# Patient Record
Sex: FEMALE | Race: ASIAN | NOT HISPANIC OR LATINO | URBAN - METROPOLITAN AREA
[De-identification: names, ages, dates, MRNs, and addresses within clinical notes are randomized per-mention and may not be internally consistent; named-entity substitution may affect disease eponyms.]

---

## 2018-10-20 ENCOUNTER — EMERGENCY (EMERGENCY)
Facility: HOSPITAL | Age: 43
LOS: 1 days | Discharge: HOME | End: 2018-10-20
Attending: EMERGENCY MEDICINE
Payer: COMMERCIAL

## 2018-10-20 VITALS
TEMPERATURE: 98 F | RESPIRATION RATE: 18 BRPM | OXYGEN SATURATION: 96 % | SYSTOLIC BLOOD PRESSURE: 116 MMHG | WEIGHT: 179.9 LBS | HEIGHT: 68 IN | HEART RATE: 87 BPM | DIASTOLIC BLOOD PRESSURE: 79 MMHG

## 2018-10-20 VITALS
DIASTOLIC BLOOD PRESSURE: 94 MMHG | HEART RATE: 82 BPM | TEMPERATURE: 98 F | SYSTOLIC BLOOD PRESSURE: 125 MMHG | OXYGEN SATURATION: 96 % | RESPIRATION RATE: 16 BRPM

## 2018-10-20 PROCEDURE — 99284 EMERGENCY DEPT VISIT MOD MDM: CPT | Mod: 25

## 2018-10-20 PROCEDURE — 96374 THER/PROPH/DIAG INJ IV PUSH: CPT

## 2018-10-20 PROCEDURE — 99284 EMERGENCY DEPT VISIT MOD MDM: CPT

## 2018-10-20 PROCEDURE — 96375 TX/PRO/DX INJ NEW DRUG ADDON: CPT

## 2018-10-20 RX ORDER — KETOROLAC TROMETHAMINE 30 MG/ML
15 SYRINGE (ML) INJECTION ONCE
Qty: 0 | Refills: 0 | Status: DISCONTINUED | OUTPATIENT
Start: 2018-10-20 | End: 2018-10-20

## 2018-10-20 RX ORDER — SODIUM CHLORIDE 9 MG/ML
3 INJECTION INTRAMUSCULAR; INTRAVENOUS; SUBCUTANEOUS EVERY 8 HOURS
Qty: 0 | Refills: 0 | Status: DISCONTINUED | OUTPATIENT
Start: 2018-10-20 | End: 2018-10-24

## 2018-10-20 RX ORDER — MORPHINE SULFATE 50 MG/1
4 CAPSULE, EXTENDED RELEASE ORAL ONCE
Qty: 0 | Refills: 0 | Status: DISCONTINUED | OUTPATIENT
Start: 2018-10-20 | End: 2018-10-20

## 2018-10-20 RX ORDER — ACETAMINOPHEN 500 MG
1000 TABLET ORAL ONCE
Qty: 0 | Refills: 0 | Status: COMPLETED | OUTPATIENT
Start: 2018-10-20 | End: 2018-10-20

## 2018-10-20 RX ORDER — DIAZEPAM 5 MG
5 TABLET ORAL ONCE
Qty: 0 | Refills: 0 | Status: DISCONTINUED | OUTPATIENT
Start: 2018-10-20 | End: 2018-10-20

## 2018-10-20 RX ORDER — LIDOCAINE 4 G/100G
1 CREAM TOPICAL ONCE
Qty: 0 | Refills: 0 | Status: COMPLETED | OUTPATIENT
Start: 2018-10-20 | End: 2018-10-20

## 2018-10-20 RX ADMIN — MORPHINE SULFATE 4 MILLIGRAM(S): 50 CAPSULE, EXTENDED RELEASE ORAL at 15:39

## 2018-10-20 RX ADMIN — Medication 5 MILLIGRAM(S): at 17:51

## 2018-10-20 RX ADMIN — Medication 60 MILLIGRAM(S): at 15:39

## 2018-10-20 RX ADMIN — Medication 15 MILLIGRAM(S): at 19:45

## 2018-10-20 RX ADMIN — Medication 400 MILLIGRAM(S): at 15:39

## 2018-10-20 RX ADMIN — LIDOCAINE 1 PATCH: 4 CREAM TOPICAL at 15:39

## 2018-10-20 NOTE — ED ADULT NURSE NOTE - CHPI ED NUR SYMPTOMS NEG
no constipation/no fatigue/no motor function loss/no bladder dysfunction/no anorexia/no difficulty bearing weight/no neck tenderness/no numbness/no tingling/no bowel dysfunction

## 2018-10-20 NOTE — ED ADULT NURSE NOTE - OBJECTIVE STATEMENT
pt is a 43 yr F presents with R lower back pain shooting down R leg since 930 this morning. pt has hx of chronic back pain that usually resolves with aleve, took aleve approx 2-230pm without relief. unable to ambulate related to pain. denies numbness/tingling/weakness/urinary incontinence. +tender to touch in R lower back. no distress. family at bedside.

## 2018-10-20 NOTE — ED ADULT NURSE NOTE - NSIMPLEMENTINTERV_GEN_ALL_ED
Implemented All Fall Risk Interventions:  [place] to call system. Call bell, personal items and telephone within reach. Instruct patient to call for assistance. Room bathroom lighting operational. Non-slip footwear when patient is off stretcher. Physically safe environment: no spills, clutter or unnecessary equipment. Stretcher in lowest position, wheels locked, appropriate side rails in place. Provide visual cue, wrist band, yellow gown, etc. Monitor gait and stability. Monitor for mental status changes and reorient to person, place, and time. Review medications for side effects contributing to fall risk. Reinforce activity limits and safety measures with patient and family.

## 2018-10-20 NOTE — ED PROVIDER NOTE - ATTENDING CONTRIBUTION TO CARE
**ATTENDING ADDENDUM (Dr. Kashif Reeves): I attest that I have directly examined this patient and reviewed and formulated the diagnostic and therapeutic management plan in collaboration with the EM resident. Please see MDM note and remainder of EMR for findings from CC, HPI, ROS, and PE. (Gold)

## 2018-10-20 NOTE — ED PROVIDER NOTE - CARE PLAN
Principal Discharge DX:	Acute right-sided low back pain with right-sided sciatica  Goal:	ATTENDING ADDENDUM (Dr. Kashif Reeves): Goals of care include resolution of emergent/urgent symptoms and concerns, and restoration to baseline level of homeostasis.  Secondary Diagnosis:	Sciatica of right side

## 2018-10-20 NOTE — ED PROVIDER NOTE - OBJECTIVE STATEMENT
43F hx chronic back pain 2/2 herniated discs presents with back pain. Patient was cooking in the kitchen when she developed sudden onset sharp, constant R lower back pain that radiates down R leg. Worse with movement and bending forward. Better when lying still. Feels typical of her usual back pain, just very severe. Took 1 aleve at noon. Denies nausea/vomiting, fevers/chills, abd pain, cp, sob, weakness, incontinence, injections, no other concerns. 43F hx chronic back pain 2/2 herniated discs presents with back pain. Patient was cooking in the kitchen when she developed sudden onset sharp, constant R lower back pain that radiates down R leg. Worse with movement and bending forward. Better when lying still. Feels typical of her usual back pain, just very severe. Took 1 aleve at noon. Denies nausea/vomiting, fevers/chills, abd pain, cp, sob, weakness, incontinence, injections, no other concerns.  **ATTENDING ADDENDUM (Dr. Kashif Reeves): I attest that I have directly and personally interviewed and examined this patient and elicited a comparable history of present illness and review of systems as documented, along with my EM resident. I attest that I have made significant contributions to the documentation where necessary and as noted in the EMR.

## 2018-10-20 NOTE — ED PROVIDER NOTE - SKIN, MLM
Skin normal color for race, warm, dry and intact. **ATTENDING ADDENDUM (Dr. Kashif Reeves): NO rashes, lesions, vesicles, cellulitis, petechiae, purpurae, track marks or ecchymoses.

## 2018-10-20 NOTE — ED PROVIDER NOTE - CHIEF COMPLAINT
The patient is a 43y Female complaining of right-sided acute on chronic low back (lumbar) pain with radiation down right leg.

## 2018-10-20 NOTE — ED PROVIDER NOTE - PROGRESS NOTE DETAILS
**ATTENDING ADDENDUM (Dr. Kashif Reeves): evaluated by ED attending. Agree with goals/plan of ED care as described in EMR, including diagnostics, therapeutics and consultation as clinically warranted. Anticipatory guidance provided. Will continue to observe and monitor closely. NO evidence of acute cord syndrome, serious bacterial infection or sepsis/severe sepsis, fracture or dislocation, or other worrisome cause for pain other than exacerbation of known diskogenic musculoskeletal back pain at this time. **ATTENDING ADDENDUM (Dr. Kashif Reeves): reviewed ordered with MAGDALENE Carias. Patient had already taken naproxen at home prior to arrival, therefore will hold/cancel order for ketorolac IV. Will continue to observe and monitor closely. **ATTENDING ADDENDUM (Dr. Kashif Reeves): patient with modest improvement following medications. Case reviewed with EM resident Bin. Will continue to observe and monitor closely. Will attempt to add different analgesics (and antispasmodics) to patient's regiment. Anticipatory guidance provided by ED team throughout ED course. AG PGY2: Patient feels improved and would like to go home. appears well. will d/c home with f/u precautions. AG PGY2: Patient feels improved and would like to go home. appears well. will d/c home with f/u precautions.  Attending Statement: Agree with the above.  Ambulatory, comfortable c d/c plan.  To F/U c PCP.  Return precautions provided.  --BMM

## 2018-10-20 NOTE — ED PROVIDER NOTE - PLAN OF CARE
ATTENDING ADDENDUM (Dr. Kashif Reeves): Goals of care include resolution of emergent/urgent symptoms and concerns, and restoration to baseline level of homeostasis.

## 2018-10-20 NOTE — ED PROVIDER NOTE - MEDICAL DECISION MAKING DETAILS
**ATTENDING MEDICAL DECISION MAKING/SYNTHESIS (Dr. Kashif Reeves): I have reviewed the Chief Complaint, the HPI, the ROS, and have directly performed and confirmed the findings on the Physical Examination. I have reviewed the medical decision making with all providers, as applicable. The PROBLEM REPRESENTATION at this time is: 43-year-old woman with history of uterine mass (presumed fibroid, scheduled for resection in December 2018) and chronic low back pain (right-sided sciatica) now presenting with acute exacerbation of right-sided radicular sciatica low back pain. The MOST LIKELY DIAGNOSIS, and the LIST OF DIFFERENTIAL DIAGNOSES, includes (but is not limited to) the following: sciatica/radicular back pain (likely), fracture or dislocation of spine (NO evidence), trauma (none reported, DV considered but unlikely), serious bacterial infection or sepsis/severe sepsis e.g. diskitis, epidural hematoma, urinary tract infection, pyelonephritis, herpes zoster or equivalent (NO evidence), dehydration, electrolyte-metabolic-endocrine derangements, spine emergency (occult metastatic disease, fracture or equivalent causing partial or complete cord syndrome; NO evidence; NO evidence of saddle anesthesia, acute urinary retention or incontinence at this time). The likelihood of each of these diagnoses has been appropriately considered in the context of this patient's presentation and my evaluation. PLAN: as described in EMR, including diagnostics, therapeutics and consultation as clinically warranted. I will continue to reevaluate the patient, including the results of all testing, and monitor response to therapy throughout the patient's course in the ED.

## 2018-10-20 NOTE — ED PROVIDER NOTE - CHPI ED SYMPTOMS NEG
**ATTENDING ADDENDUM (Dr. Kashif Reeves): NO saddle anesthesia, acute urinary retention, or incontience./no numbness/no bowel dysfunction/no fatigue/no tingling/no constipation/no bladder dysfunction/no motor function loss

## 2018-10-20 NOTE — ED PROVIDER NOTE - PHYSICAL EXAMINATION
**ATTENDING ADDENDUM (Dr. Kashif Reeves): I have reviewed and substantially contributed to the elements of the PE as documented above. I have directly performed an examination of this patient in conjunction with the other members (EM resident/PA/NP) of the patient care team. Of note, and in addition to the above, the patient prefers to be in the left lateral recumbent position. Radicular pain reported moving down the right lower extremity from the right paraspinal lumbar back area. NO rashes, lesions, vesicles, cellulitis, petechiae, purpurae, track marks or ecchymoses. NO foot drop. NO saddle anesthesia, acute urinary retention, or incontinence. NO new numbness, tingling, weakness, or paresthesias. NO focal or generalized weakness.

## 2018-10-20 NOTE — ED PROVIDER NOTE - CONTEXT
**ATTENDING ADDENDUM (Dr. Kashif Reeves): movement. NO antecedent trauma or falls reported./bending/twisting/flexion

## 2018-10-20 NOTE — ED PROVIDER NOTE - SHIFT CHANGE DETAILS
**ATTENDING ADDENDUM - HANDOFF (from Dr. Kashif Reeves): (1) Serial reevaluation / observation (3) Disposition pending, possible CDU v. admission v. discharge (if clinically improved)

## 2018-10-20 NOTE — ED PROVIDER NOTE - NEUROLOGICAL, MLM
Alert and oriented, no focal deficits, no motor or sensory deficits. cn2-12 grossly intact, normal speech and tone, normal gait.

## 2018-10-20 NOTE — ED PROVIDER NOTE - TIMING
worsening/gradual onset/**ATTENDING ADDENDUM (Dr. Kashif Reeves): POSITIVE movement-associated, reproducible with palpation, reproducible with positional changes./constant

## 2018-10-20 NOTE — ED PROVIDER NOTE - RELIEVING FACTORS
**ATTENDING ADDENDUM (Dr. Kashif Reeves): tried naproxen prior to arrival without complete relief. Also tried complementary/alternative manual medicine (chiropractic/ therapy) without relief.